# Patient Record
Sex: MALE | Race: OTHER | HISPANIC OR LATINO | URBAN - METROPOLITAN AREA
[De-identification: names, ages, dates, MRNs, and addresses within clinical notes are randomized per-mention and may not be internally consistent; named-entity substitution may affect disease eponyms.]

---

## 2020-09-29 ENCOUNTER — EMERGENCY (EMERGENCY)
Facility: HOSPITAL | Age: 55
LOS: 1 days | Discharge: HOME | End: 2020-09-29
Attending: EMERGENCY MEDICINE | Admitting: EMERGENCY MEDICINE
Payer: COMMERCIAL

## 2020-09-29 VITALS
SYSTOLIC BLOOD PRESSURE: 139 MMHG | HEART RATE: 73 BPM | OXYGEN SATURATION: 100 % | DIASTOLIC BLOOD PRESSURE: 105 MMHG | TEMPERATURE: 98 F | RESPIRATION RATE: 16 BRPM

## 2020-09-29 VITALS — SYSTOLIC BLOOD PRESSURE: 155 MMHG | DIASTOLIC BLOOD PRESSURE: 93 MMHG

## 2020-09-29 PROCEDURE — 70450 CT HEAD/BRAIN W/O DYE: CPT | Mod: 26

## 2020-09-29 PROCEDURE — 99284 EMERGENCY DEPT VISIT MOD MDM: CPT

## 2020-09-29 NOTE — ED PROVIDER NOTE - ATTENDING CONTRIBUTION TO CARE
56 yo with no PMH with one week of head pressure.  Worse when laying flat and in both sides of head.  radiates from neck.  No NV no photosensitive and no fevers.  Did have a few episodes of intermittent blurry vision but none at current.    Gen: Well appearing in NAD  Head: NC/AT  Eye: PERRL  Neck: trachea midline  Resp:  No distress  Ext: no deformities  Neuro:  A&O non focal  Skin:  Warm and dry as visualized  Psych:  Normal affect and mood    56 yo with HA.  No red flags for SAH.  Will get imaging to ensure no mass but low suspicion.  Pt refusing pain meds at this time.  IF CT negative will send for neuro for fu

## 2020-09-29 NOTE — ED PROVIDER NOTE - NS ED ROS FT
General: denies fever, chills, weight loss/weight gain.  HENT: denies nasal congestion, sore throat, rhinorrhea, ear pain  Eyes: +blurred vision. No eye pain  Neck: +neck pain  CV: denies chest pain, palpitations  Resp: denies difficulty breathing, cough  Abdominal: denies nausea, vomiting, diarrhea, abdominal pain, blood in stool, dark stool  MSK: denies muscle aches, bony pain, leg pain, leg swelling  Neuro:+HA. +numbness  in shoulders.   Skin: denies rashes, cuts, bruises

## 2020-09-29 NOTE — ED ADULT TRIAGE NOTE - CHIEF COMPLAINT QUOTE
pt c/o "head pressure" that radiates through his shoulders only when he lies down, states he "even tried using a tempur pedic pillow with no relief", has not taken any OTC medication for pain control, denies visual changes

## 2020-09-29 NOTE — ED ADULT NURSE NOTE - OBJECTIVE STATEMENT
patient aaox3. ambulatory w/o assist. came in with head pressure when lying down. patient reports pain increases when flat and is relieved when sitting up. has been sleeping sitting up. denies head trauma, falls, dizziness, weakness, lightheadedness, numbness, tingling, fevers, chills, neck pain, changes in vision or hearing. denies any alcohol or drug use. md at bedside for eval. Will continue to monitor

## 2020-09-29 NOTE — ED PROVIDER NOTE - OBJECTIVE STATEMENT
55 year old M no PMH presents with head pressure x 1 week. Explicitly states that it is not pain, but when he lies down flat he gets the pressure in his head, both sides, R worse than L. Starts posteriorly at the neck and goes up. Improves with sitting up. Reports he had two episodes of blurred vision last week. Came in today because he was very worried. No nausea or vomiting. Never had similar sx before. No fevers, chills. Not worse with leaning forward. Also occurs when he lifts his arms up (he is a ). Reports some numbness in his shoulders at times. Has not taken anything for pain. No other vision changes. No family hx of malignancy/brain masses. Patient reports that he has been extra stressed recently. The landlord is increasing his rent by 150$ and he's very worried about it.  Patient does state he lifted a heavy box over one week ago.

## 2020-09-29 NOTE — ED PROVIDER NOTE - NSFOLLOWUPINSTRUCTIONS_ED_ALL_ED_FT
You were seen in the ED for head pain. Your CT head did not show any acute abnormalities such as brain bleeds or tumors. Your physical exam is reassuring.    For pain, please take 650mg Tylenol every 6 hours as needed or 600mg ibuprofen every 8 hours as needed. Always take ibuprofen with food. You make take both Tylenol and ibuprofen as described above if one medication is not enough for your pain.    Please follow up with your PCP in 2-3 days. Return to the ED if you experience any worsening or new symptoms or any symptoms that concern you, including increasing head pain, numbness, inability to walk, vision changes, fevers, chills.

## 2020-09-29 NOTE — ED PROVIDER NOTE - PHYSICAL EXAMINATION
General appearance: NAD, conversant, afebrile    Eyes: anicteric sclerae, moist conjunctivae; no lid-lag; Pupils equal, round and reactive to light; Extraocular muscles intact   HENT: Atraumatic; oropharynx clear with moist mucous membranes and no mucosal ulcerations; normal hard and soft palate; no pharyngeal erythema or exudate   Neck: Trachea midline; FROM. +TTP paracervical on R and L. No midline TTP.   Pulm: Lungs clear to auscultation bilaterally, with normal respiratory effort and no intercostal retractions; normal work of breathing   CV: Regular Rhythm and Rate; Normal S1, S2; No murmurs, rubs, or gallops. 2+ peripheral pulses.   Abdomen: Soft, non-tender, non-distended; no masses or hepatosplenomegaly.   Extremities: No peripheral edema or extremity lymphadenopathy. 5/5 strength in all four extremities.   Skin: Normal temperature, turgor and texture; no rash, ulcers or subcutaneous nodules   Psych: Appropriate affect, cooperative; alert and oriented to person, place and time  Neuro: CNII-XII intact. Normal gait. No horizontal or vertical nystagmus. Strength 5/5 in BUE and BLE. Sensation intact and equal grossly in BUE and BLE.

## 2020-09-29 NOTE — ED PROVIDER NOTE - PATIENT PORTAL LINK FT
You can access the FollowMyHealth Patient Portal offered by [facility] by registering at the following website: [email or web address]. By joining [employer]’s FollowMyHealth portal, you will also be able to view your health information using other applications (apps) compatible with our system. You can access the FollowMyHealth Patient Portal offered by [facility] by registering at the following website: [email or web address]. By joining [employer]’s FollowMyHealth portal, you will also be able to view your health information using other applications (apps) compatible with our system.

## 2020-09-29 NOTE — ED PROVIDER NOTE - NSFOLLOWUPCLINICS_GEN_ALL_ED_FT
[facility] General Internal Medicine  General Internal Medicine  [address] [id number]  Phone: [phone]  Fax:   Follow Up Time:        [facility] Internal Medicine  General Internal Medicine  [address] [place] [address]  Phone: [phone]  Fax:     [facility] [place]  Internal Medicine  [address] [place] [address]  Phone: [phone]  Fax: [phone]  Follow Up Time: